# Patient Record
(demographics unavailable — no encounter records)

---

## 2024-10-18 NOTE — ASSESSMENT
[FreeTextEntry1] : 45 year old woman with pain to left arm EMG/NCV with no evidence for cervical radiculopathy  patient will follow up with Dr. Vaughn

## 2024-11-14 NOTE — ASSESSMENT
[FreeTextEntry1] : Ms. BENI RODRÍGUEZ is a 45 year F with pain in the neck and lower back across with radiation down the arm/leg on the LEFT due to cervical and lumbar radiculopathy and myofascial pain syndrome. Symptoms are improved with Gabapentin.  Patient reassured and educated on the diagnosis and treatment options. Risks and benefits of treatment and of delaying treatment discussed with patient. Risks discussed include but not limited to: progression of symptoms, worsening pain and functional status, etc.  This note was generated using Dragon medical dictation software. A reasonable effort had been made for proofreading its contents, but spelling mistakes or grammatical errors may still remain. If there are any questions or points of clarification needed please notify my office.  Continue Gabapentin 300mg PO nightly for neuropathic pain. Monitor for sedation, dizziness and any signs of respiratory depression. Avoid taking together with opioid pain medicines and other drugs that depress the central nervous system function. Avoid sudden cessation after prolonged use, due to risk of seizures. Risks and benefits were explained and patient expressed understanding.  Start weaning off by taking every other night for a month and then stop  Reviewed EMG/NCS to eval for CERVICAL and LUMBAR radiculopathy Follow up 2 months  Patient was advised if the following symptoms develop: chills, fever, loss of bladder control, bowel incontinence or urinary retention, numbness/tingling or weakness is present in upper or lower extremities, to go to the nearest emergency room. This may be a new clinical condition not present at the time of the patient visit that may lead to paralysis and/or death. Patient advised if the above symptoms developed to also call the office immediately to inform us and to go to the nearest emergency room.

## 2024-11-14 NOTE — DATA REVIEWED
[EMG] : EMG [FreeTextEntry1] : 10/18/24: 45 year old woman with pain to left arm EMG/NCV with no evidence for cervical radiculopathy patient will follow up with Dr. Vaughn.  9/26/24: 45 year old woman with pain radiating to left lower extremity, occasional right. EMG/NCV with no evidence for lumbar radiculopathy patient can return to EMG UE, will follow up with Dr. Vaughn.  10

## 2024-11-14 NOTE — HISTORY OF PRESENT ILLNESS
[FreeTextEntry1] : BENI RODRÍGUEZ is here for follow up. Since last visit she had EMG/NCS with Dr. Bee. She has been taking Gabapentin at night with excellent results. Her pain and tingling have resolved and she is very happy. Paain is 0/10 on NRS. She denies any side effects from Gabapentin.   Denies any new pain, numbness or weakness, bowel/bladder dysfunction, saddle anesthesia, fevers, chills, weight loss, night pain, or night sweats at this time.

## 2025-03-24 NOTE — PLAN
[FreeTextEntry1] : 45F PMH cervical and lumbar radiculopathy presents for an annual physical.  # cervical and lumbar radiculopathy - Flexeril renewed PRN - side effects discussed, take before bed - see PM&R - no new symptoms  # screenings - negative depression screening - HIV negative in 8/2021 - colo never done - given GI referral - mammo and breast sono normal in 06/2024 - new script given for both - sees derm for yearly skin checks - pap and HPV normal in 04/2024 - recommended to go back to GYN  # vaccines - got covid vaccine x3 Pfizer - declining flu shot this season - declining tdap vaccine

## 2025-03-24 NOTE — HEALTH RISK ASSESSMENT
[Good] : ~his/her~  mood as  good [Never (0 pts)] : Never (0 points) [No] : In the past 12 months have you used drugs other than those required for medical reasons? No [No falls in past year] : Patient reported no falls in the past year [0] : 2) Feeling down, depressed, or hopeless: Not at all (0) [PHQ-2 Negative - No further assessment needed] : PHQ-2 Negative - No further assessment needed [Never] : Never [Patient reported mammogram was normal] : Patient reported mammogram was normal [Patient reported PAP Smear was normal] : Patient reported PAP Smear was normal [HIV test declined] : HIV test declined [Hepatitis C test declined] : Hepatitis C test declined [With Family] : lives with family [Employed] : employed [] :  [# Of Children ___] : has [unfilled] children [Fully functional (bathing, dressing, toileting, transferring, walking, feeding)] : Fully functional (bathing, dressing, toileting, transferring, walking, feeding) [Fully functional (using the telephone, shopping, preparing meals, housekeeping, doing laundry, using] : Fully functional and needs no help or supervision to perform IADLs (using the telephone, shopping, preparing meals, housekeeping, doing laundry, using transportation, managing medications and managing finances) [Reports normal functional visual acuity (ie: able to read med bottle)] : Reports normal functional visual acuity [Audit-CScore] : 0 [de-identified] : minimal [de-identified] : no restrictions [[id number]] : 0 [LowDoseCTScan] : NA [EyeExamDate] : NA [Change in mental status noted] : No change in mental status noted [Language] : denies difficulty with language [Behavior] : denies difficulty with behavior [Learning/Retaining New Information] : denies difficulty learning/retaining new information [Handling Complex Tasks] : denies difficulty handling complex tasks [Reasoning] : denies difficulty with reasoning [Spatial Ability and Orientation] : denies difficulty with spatial ability and orientation [Reports changes in hearing] : Reports no changes in hearing [Reports changes in vision] : Reports no changes in vision [Reports changes in dental health] : Reports no changes in dental health [MammogramDate] : 06/24 [PapSmearDate] : 04/24 no [BoneDensityDate] : NA [ColonoscopyComments] : given script [HIVDate] : 08/21 [HIVComments] : negative [de-identified] : with  and 1 daughter [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

## 2025-03-24 NOTE — HISTORY OF PRESENT ILLNESS
[de-identified] : 45F PMH cervical and lumbar radiculopathy presents for an annual physical.  She takes Claritin for allergies. She denies any medication allergies. She denies any past surgeries. She notes family history of mother with breast cancer. She denies any smoking, alcohol, and drug use. She notes minimal physical activity. She notes no restrictions in her diet. She works for with packaging medications. She lives at home with her  and 1 daughter.   She had a normal pap and HPV in 04/2024. She has not had a colo and is interested a referral. She sees derm for yearly skin checks. She had normal mammo and breast sono in 06/2024. She got covid vaccine x3 pfizer. She is declining flu shot this season. She is declining tdap vaccine.

## 2025-05-05 NOTE — ASSESSMENT
[FreeTextEntry1] : 1. chronic constipation  plan miralax daily  2. average risk for colon cancer recommend colonoscopy for screening  Discussed risks including but not limited to bleeding,infection,drug reaction, perforation,missed lesion,benefits and alternatives of colonoscopy/egd with patient  including no treatment and patient consents to procedure.  plan colonoscopy for screening

## 2025-05-05 NOTE — PHYSICAL EXAM
[Alert] : alert [Healthy Appearing] : healthy appearing [Sclera] : the sclera and conjunctiva were normal [Hearing Threshold Finger Rub Not [place]] : hearing was normal [Normal Appearance] : the appearance of the neck was normal [No Respiratory Distress] : no respiratory distress [Auscultation Breath Sounds / Voice Sounds] : lungs were clear to auscultation bilaterally [Heart Rate And Rhythm] : heart rate was normal and rhythm regular [None] : no edema [Bowel Sounds] : normal bowel sounds [Abdomen Tenderness] : non-tender [Abdomen Soft] : soft [No CVA Tenderness] : no CVA  tenderness [No Clubbing, Cyanosis] : no clubbing or cyanosis of the fingernails [] : no rash [No Focal Deficits] : no focal deficits [Oriented To Time, Place, And Person] : oriented to person, place, and time

## 2025-05-05 NOTE — REVIEW OF SYSTEMS
[As Noted in HPI] : as noted in HPI [Fever] : no fever [Chills] : no chills [Chest Pain] : no chest pain [SOB on Exertion] : no shortness of breath during exertion [Rash] : no rash [Joint Stiffness] : no joint stiffness [Skin Wound] : no skin wound [Fainting] : no fainting [Anxiety] : no anxiety [Muscle Weakness] : no muscle weakness [Easy Bruising] : no tendency for easy bruising

## 2025-05-05 NOTE — HISTORY OF PRESENT ILLNESS
[FreeTextEntry1] : 46 yo female with c/o constipation,  takes papaya for bm, patient reports one bm every other day, , symptoms for years. no brbpr , no melena. no weight loss. no n/v. no gerd  no family h/o colon or gastric cancer never had colonoscopy